# Patient Record
(demographics unavailable — no encounter records)

---

## 2025-06-04 NOTE — HISTORY OF PRESENT ILLNESS
[Unable To Restrain Bowel Movement] : no [Urinary Frequency More Than Twice At Night (Nocturia)] : no nocturia [Urinary Frequency] : no [Feelings Of Urinary Urgency] : no [Pain During Urination (Dysuria)] : no [Constipation Obstructed Defecation] : no [] : no [Stool Visible Blood] : no [Incomplete Emptying Of Stool] : no [de-identified] : daily [FreeTextEntry5] : bala [de-identified] : 4-5x/d [de-identified] : sometimes [de-identified] : sometimes [FreeTextEntry1] : Kathia is a postmenopausal 55yo (LMP: 3y ago) who presents with bothersome pelvic pressure and bulge symptoms for a few months. She is interested in surgical correction.   Denies history of abnormal pap smears, ovarian cysts, fibroids. Denies family history of breast or Gyn malignancies, anesthesia complications, bleeding/clotting disorders.   PMH: HTN. Denies history of glaucoma  PSH: Appendectomy, CDx1 Soc: Never smoker All: Penicillin (swelling)  Daily fluid intake: 5-6c water + 1-2c coffee. No tea, juice, soda, seltzer.

## 2025-06-04 NOTE — PROCEDURE
[Straight Catheterization] : insertion of a straight catheter [Urinary Tract Infection] : a urinary tract infection [___ Fr Straight Tip] : a [unfilled] in Egyptian straight tip catheter [None] : there were no complications with the catheter insertion [Clear] : clear [Culture] : culture

## 2025-06-04 NOTE — PHYSICAL EXAM
[MA] : MA [Scar] : a scar was noted [Labia Majora] : were normal [Labia Minora] : were normal [Normal Appearance] : general appearance was normal [Atrophy] : atrophy [No Bleeding] : there was no active vaginal bleeding [Normal] : no abnormalities [Exam Deferred] : was deferred [Rectocele] : a rectocele [Cystocele] : a cystocele [Uterine Prolapse] : uterine prolapse [1] : 1 [Aa ____] : Aa [unfilled] [Ba ____] : Ba [unfilled] [C ____] : C [unfilled] [GH ____] : GH [unfilled] [PB ____] : PB [unfilled] [TVL ____] : TVL  [unfilled] [Ap ____] : Ap [unfilled] [Bp ____] : Bp [unfilled] [D ____] : D [unfilled] [FreeTextEntry2] : Eunice [FreeTextEntry1] : General: Well, appearing. Alert and orientated. No acute distress HEENT: Normocephalic, atraumatic and extraocular muscles appear to be intact Neck: Full range of motion, no obvious lymphadenopathy, deformities, or masses noted Respiratory: Speaking in full sentences comfortably, normal work of breathing and no cough during visit Musculoskeletal: full range of motion  Extremities: No upper extremity edema noted Skin: no obvious rash or skin lesions Neuro: Orientated X 3, speech is fluent, normal rate Psych: Normal mood and affect [Tenderness] : ~T no ~M abdominal tenderness observed [Distended] : not distended [FreeTextEntry3] : (+) hypermobility, (-) cough stress test  [de-identified] : Reduction of anterior wall prolapse with elevation of the cervix

## 2025-06-04 NOTE — COUNSELING
Wound care consult/recommendations     Left Groin- Chronic full thickness   Irrigate with normal saline or wound cleanser, Pat dry.  Apply no sting skin barrier (cavilon) to daniel wound   Gently pack Mesalt rope into wound bed  Cover with Mepilex border dressing   Change every day and as needed.       Apply Calmoseptine ointment to buttocks/perineum/groin four times a day and as needed after incontinence.       While in bed patient should only be on one fitted sheet, and one chux. Please, do not use brief while patient is resting in bed. Elevate heels off the bed surface at all times. Turn and reposition at least every 2 hours.    [Nutrition/ Exercise/ Weight Management] : nutrition, exercise, weight management DISPLAY PLAN FREE TEXT [Body Image] : body image [Vitamins/Supplements] : vitamins/supplements DISPLAY PLAN FREE TEXT DISPLAY PLAN FREE TEXT DISPLAY PLAN FREE TEXT DISPLAY PLAN FREE TEXT DISPLAY PLAN FREE TEXT DISPLAY PLAN FREE TEXT

## 2025-06-04 NOTE — REASON FOR VISIT
[Initial Visit ___] : an initial visit for [unfilled] [Family Member] : family member [Pelvic Organ Prolapse] : pelvic organ prolapse [Declined  Services] : Patient was offered free  services in ~his/her~ preferred language and declined services. Patient insisted on family member providing interpretation

## 2025-06-04 NOTE — DISCUSSION/SUMMARY
[FreeTextEntry1] : We reviewed the findings of Stage 2 uterovaginal prolapse, cystocele, rectocele with the patient and her daughter. Treatment options for the prolapse were discussed and included observation, pelvic floor exercises with or without physical therapy, a pessary, or surgical correction. As her PVR today was 30ml, we discussed she is still a candidate for observation. With pelvic floor exercises, we discussed the need for regular performance to see improvement. With pessaries, we discussed the fitting process and need for regular maintenance. Surgically we discussed the abdominal vs the vaginal routes. Abdominally we discussed a hysterectomy and a sacral colpopexy either via laparotomy or laparoscopically and robotically. Vaginally we discussed a vaginal hysterectomy and native tissue repair. Surgically we discussed hysteropexy as well as the use of biologics. She does not want her uterus removed. If interested in surgery, we discussed obtaining urodynamics. Her last pap was NILM, HPV from 4/23/25, pelvic US done on 4/21/25. We also discussed the perioperative course and reviewed postoperative restrictions of complete pelvic rest and avoidance of strenuous exercise/heavy lifting (>10lb) for 6 weeks.   Based on the counseling, she is interested in a sacrospinous hysteropexy, anterior posterior repair. Booking slip placed. She understands that she is maintaining her uterus and will need to continue getting pap smears for cervical cancer screening.    [facility] handout on POP given to patient. RTO for UDS and then PRA.

## 2025-06-25 NOTE — PROCEDURE
[Straight Catheterization] : insertion of a straight catheter [Urinary Tract Infection] : a urinary tract infection [___ Fr Straight Tip] : a [unfilled] in Hungarian straight tip catheter [None] : there were no complications with the catheter insertion [Clear] : clear [Culture] : culture

## 2025-06-25 NOTE — HISTORY OF PRESENT ILLNESS
[Unable To Restrain Bowel Movement] : no [Urinary Frequency More Than Twice At Night (Nocturia)] : no nocturia [Urinary Frequency] : no [Feelings Of Urinary Urgency] : no [Pain During Urination (Dysuria)] : no [Constipation Obstructed Defecation] : no [] : no [Stool Visible Blood] : no [Incomplete Emptying Of Stool] : no [de-identified] : daily [FreeTextEntry5] : bala [de-identified] : 4-5x/d [de-identified] : sometimes [de-identified] : sometimes [FreeTextEntry1] : Kathia is a postmenopausal 55yo (LMP: 3y ago) with Stage 2 uterovaginal prolapse, cystocele, rectocele. She is interested in surgical correction.   Denies history of abnormal pap smears, ovarian cysts, fibroids. Denies family history of breast or Gyn malignancies, anesthesia complications, bleeding/clotting disorders.   Preop Eval: Pap (4/23/25): NILM, HPV neg US (4/21/25): 4.3x3.2x5.3cm, ES 4.1mm UDS: - JOSE J at capacity with caths out, no DO, no ISD, PVR 15ml, MCFP 300ml  PMH: HTN. Denies history of glaucoma  PSH: Appendectomy, CDx1 Soc: Never smoker All: Penicillin (swelling)  Daily fluid intake: 5-6c water + 1-2c coffee. No tea, juice, soda, seltzer.

## 2025-06-25 NOTE — REASON FOR VISIT
[Family Member] : family member [Language Line ] : provided by Language Line   [Follow-Up Visit_____] : a follow-up visit for [unfilled] [Interpreters_IDNumber] : [id number] [Interpreters_FullName] : Cayden [[id number]] : Azeri

## 2025-06-25 NOTE — PHYSICAL EXAM
[MA] : MA [Scar] : a scar was noted [Labia Majora] : were normal [Labia Minora] : were normal [Normal Appearance] : general appearance was normal [Atrophy] : atrophy [Rectocele] : a rectocele [Cystocele] : a cystocele [Uterine Prolapse] : uterine prolapse [No Bleeding] : there was no active vaginal bleeding [Aa ____] : Aa [unfilled] [Ba ____] : Ba [unfilled] [C ____] : C [unfilled] [GH ____] : GH [unfilled] [PB ____] : PB [unfilled] [TVL ____] : TVL  [unfilled] [Ap ____] : Ap [unfilled] [Bp ____] : Bp [unfilled] [D ____] : D [unfilled] [Normal] : no abnormalities [Exam Deferred] : was deferred [FreeTextEntry2] : Eunice [FreeTextEntry1] : General: Well, appearing. Alert and orientated. No acute distress HEENT: Normocephalic, atraumatic and extraocular muscles appear to be intact Neck: Full range of motion, no obvious lymphadenopathy, deformities, or masses noted Respiratory: Speaking in full sentences comfortably, normal work of breathing and no cough during visit Musculoskeletal: full range of motion  Extremities: No upper extremity edema noted Skin: no obvious rash or skin lesions Neuro: Orientated X 3, speech is fluent, normal rate Psych: Normal mood and affect [Tenderness] : ~T no ~M abdominal tenderness observed [Distended] : not distended

## 2025-06-25 NOTE — DISCUSSION/SUMMARY
[FreeTextEntry1] : Kathia presents with Stage 2 uterovaginal prolapse, cystocele, rectocele. We discussed her urodynamics findings, which did not show any stress incontinence or detrusor overactivity. Although there were no abnormalities on UDS, we discussed she could still develop urinary symptoms postoperatively.   Regarding her prolapse, we reviewed both nonsurgical and surgical options and she continues to desire surgical management. She has been counseled regarding options for reconstructive surgery. This includes both abdominal, laparoscopic, robotic, and vaginal options. The options for repairs with and without the use of mesh and biological materials were reviewed. She has elected for the vaginal approach to surgery. Based on our discussion she would like to go with a sacrospinous hysteropexy and anterior posterior repair. As she has opted for a uterine-sparing procedure, we discussed she will need continued cervical cancer screening.   We discussed success rates, failure rates, and possible risks. The risks discussed include, but are not limited to: changes to the vaginal anatomy, vaginal pain, bleeding, pelvic pain, dyspareunia, need for revision, vaginal discharge, urinary retention, development or worsening of stress urinary incontinence or urge incontinence, infection, failure of the procedure, neuropathy. We also extensively reviewed the risk of injury to the bowel, rectum, bladder, ureters, urethra, blood vessels, and nerves. We discussed the risk of fistula formation and possible conversion to laparotomy or laparoscopy. All risks were explained in layman's terms.  Based on our discussion she would like to proceed with surgical correction. We reviewed the preoperative and postoperative instructions as well as hospital stay.   Consent was signed in the office for pelvic exam under anesthesia, sacrospinous hysteropexy, anterior posterior repair, cystoscopy. Possible laparoscopy, laparotomy

## 2025-07-02 NOTE — SUBJECTIVE
[FreeTextEntry1] : No acute distress noted [FreeTextEntry8] : Tylenol, ibuprofen, diazepam.  [FreeTextEntry7] : Right buttock pain.  [FreeTextEntry6] : Normal. No nausea/vomiting [FreeTextEntry5] : Normal. No dysuria, incontinence, incomplete emptying.  [FreeTextEntry4] : Normal.  [FreeTextEntry3] : Slowly, with support  [FreeTextEntry9] : Scant vaginal bleeding.

## 2025-07-02 NOTE — DISCUSSION/SUMMARY
[FreeTextEntry1] :  Kathia is s/p SSLF hysteropexy, anterior posterior repair, cystoscopy on 6/30/25. She presented today for buttock pain over the site of the SSLF sutures. We discussed her pain appears post-surgical and that it may respond better to a narcotic. eRX for oxycodone sent. We discussed the addictive potential, risk of constipation, nausea, dizziness with narcotics. We also discussed using warm compresses and extra cushions for additional pain relief.  RTO as scheduled.

## 2025-07-02 NOTE — OBJECTIVE
[Post Void Residual ____ ml] : Post Void Residual was [unfilled] ml [Soft and Nontender] : soft and nontender [Clean, Dry, Intact] : Clean, Dry, Intact [Good Support] : Good support [Healing well] : healing well [No Masses or Tenderness] : no masses or tenderness [FreeTextEntry3] : Sutures intact, no masses / collections, tenderness at SSLF suture sites

## 2025-07-15 NOTE — SUBJECTIVE
[FreeTextEntry1] : No acute distress noted [FreeTextEntry8] : Occasional tylenol, ibuprofen [FreeTextEntry7] : Occasional right buttock pain, controlled with PO meds   [FreeTextEntry6] : Normal. No nausea/vomiting [FreeTextEntry5] : Normal. No incontinence, incomplete emptying. Had some burning after urination, no urgency/frequency [FreeTextEntry4] : Every 2-3 days, hard.  [FreeTextEntry3] : Normal [FreeTextEntry9] : Scant vaginal discharge

## 2025-07-15 NOTE — OBJECTIVE
[Soft and Nontender] : soft and nontender [Clean, Dry, Intact] : Clean, Dry, Intact [Good Support] : Good support [Healing well] : healing well [No Masses or Tenderness] : no masses or tenderness [Post Void Residual ____ ml] : Post Void Residual was [unfilled] ml [FreeTextEntry3] : Sutures intact

## 2025-07-15 NOTE — DISCUSSION/SUMMARY
[FreeTextEntry1] : Kathia is s/p SSLF hysteropexy, anterior posterior repair, cystoscopy on 6/30/25. She is meeting her postop milestones. We discussed taking miralax for her constipation. eRx sent. For her dysuria, cath urine sent for culture. Postop instructions and restrictions reviewed   RTO in 2-4 weeks for POA.